# Patient Record
Sex: MALE | NOT HISPANIC OR LATINO | URBAN - METROPOLITAN AREA
[De-identification: names, ages, dates, MRNs, and addresses within clinical notes are randomized per-mention and may not be internally consistent; named-entity substitution may affect disease eponyms.]

---

## 2017-09-01 ENCOUNTER — IMPORTED ENCOUNTER (OUTPATIENT)
Dept: URBAN - METROPOLITAN AREA CLINIC 1 | Facility: CLINIC | Age: 62
End: 2017-09-01

## 2017-09-01 PROBLEM — E11.9: Noted: 2017-09-01

## 2017-09-01 PROBLEM — H25.813: Noted: 2017-09-01

## 2017-09-01 PROBLEM — H40.023: Noted: 2017-09-01

## 2017-09-01 PROBLEM — Z79.84: Noted: 2017-09-01

## 2017-09-01 PROBLEM — H35.033: Noted: 2017-09-01

## 2017-09-01 PROCEDURE — 92014 COMPRE OPH EXAM EST PT 1/>: CPT

## 2017-09-01 PROCEDURE — 92133 CPTRZD OPH DX IMG PST SGM ON: CPT

## 2017-09-01 NOTE — PATIENT DISCUSSION
1.  DM Type II without sign of diabetic retinopathy and no blot heme on dilated retinal examination today OU No Macular Edema:  Discussed the pathophysiology of diabetes and its effect on the eye and risk of blindness. Stressed the importance of strong glucose control. Advised of importance of at least yearly dilated examinations but to contact us immediately for any problems or concerns. Letter sent to Dr. Ambar Yin. 2.  Type II diabetes controlled by oral medications. 3.  Glaucoma Suspect OU (0.3 0.6) - IOP stable. Mild thinning by OCT OU but will cont to observe at this time. Patient is considered high risk. Condition was discussed with patient and patient understands. Will continue to monitor patient for any progression in condition. Patient was advised to call us with any problems questions or concerns. 4.  Cataract OU: Observe for now without intervention. The patient was advised to contact us if any change or worsening of vision. 5. GR I Hypertensive Retinopathy OU- Stable continue HTN Control. Return for an appointment in 1 year for 30 OCT with Dr. Aileen Fung.

## 2018-06-11 ENCOUNTER — IMPORTED ENCOUNTER (OUTPATIENT)
Dept: URBAN - METROPOLITAN AREA CLINIC 1 | Facility: CLINIC | Age: 63
End: 2018-06-11

## 2018-06-11 PROCEDURE — 92015 DETERMINE REFRACTIVE STATE: CPT

## 2019-08-26 ENCOUNTER — IMPORTED ENCOUNTER (OUTPATIENT)
Dept: URBAN - METROPOLITAN AREA CLINIC 1 | Facility: CLINIC | Age: 64
End: 2019-08-26

## 2019-08-26 PROBLEM — E11.9: Noted: 2019-08-26

## 2019-08-26 PROBLEM — Z79.84: Noted: 2019-08-26

## 2019-08-26 PROBLEM — H40.023: Noted: 2019-08-26

## 2019-08-26 PROBLEM — H25.813: Noted: 2019-08-26

## 2019-08-26 PROCEDURE — 92133 CPTRZD OPH DX IMG PST SGM ON: CPT

## 2019-08-26 PROCEDURE — 92014 COMPRE OPH EXAM EST PT 1/>: CPT

## 2019-08-26 PROCEDURE — 92015 DETERMINE REFRACTIVE STATE: CPT

## 2019-08-26 NOTE — PATIENT DISCUSSION
1.  DM Type II (Oral Meds) without sign of diabetic retinopathy and no blot heme on dilated retinal examination today OU No Macular Edema:  Discussed the pathophysiology of diabetes and its effect on the eye and risk of blindness. Stressed the importance of strong glucose control. Advised of importance of at least yearly dilated examinations but to contact us immediately for any problems or concerns. 2. Glaucoma Suspect OU (CD 0.3/0.6) Neg Fm Hx. OCT shows slight progression OUl. IOP stable on no gtts. Cont to observe off gtts. Treat with any future progression OU. 3.  Cataract OU: Observe for now without intervention. The patient was advised to contact us if any change or worsening of vision4. GR I Hypertensive Retinopathy OU- Stable continue HTN Control. Letter to PCP Nelsy Valdivia for an appointment in 6 mo 10 dfe glare VF 24-2 OU with Dr. Pauline Lorenz.

## 2020-12-17 ENCOUNTER — IMPORTED ENCOUNTER (OUTPATIENT)
Dept: URBAN - METROPOLITAN AREA CLINIC 1 | Facility: CLINIC | Age: 65
End: 2020-12-17

## 2020-12-17 PROBLEM — Z79.84: Noted: 2020-12-17

## 2020-12-17 PROBLEM — E11.9: Noted: 2020-12-17

## 2020-12-17 PROBLEM — H04.123: Noted: 2020-12-17

## 2020-12-17 PROBLEM — H40.023: Noted: 2020-12-17

## 2020-12-17 PROCEDURE — 92014 COMPRE OPH EXAM EST PT 1/>: CPT

## 2020-12-17 PROCEDURE — 92083 EXTENDED VISUAL FIELD XM: CPT

## 2020-12-17 NOTE — PATIENT DISCUSSION
1.  DM Type II (Oral Meds) without sign of diabetic retinopathy and no blot heme on dilated retinal examination today OU No Macular Edema:  Discussed the pathophysiology of diabetes and its effect on the eye and risk of blindness. Stressed the importance of strong glucose control. Advised of importance of at least yearly dilated examinations but to contact us immediately for any problems or concerns. 2. Glaucoma Suspect OU (CD 0.30/0.60) - Unknown Fm Hx. HVF WNL OD shows possible early VF defect OS vs artifact. IOP stable on no gtts. Cont to observe off gtts. Treat with any future progression OU. Pt is considered high risk OU. 3. Dry Eyes OU -Recommend ATs TID OU routinely (2x sample given)4. Cataract OU: Observe for now without intervention. The patient was advised to contact us if any change or worsening of vision5. GR I Hypertensive Retinopathy OU- Stable continue HTN Control. Patient deferred Manifest Rx today. Return for an appointment in 6 months 10/OCT with Dr. Marc Julien.

## 2020-12-17 NOTE — PATIENT DISCUSSION
Glaucoma Suspect OU (CD 0.30/0.60) - Unknown Fm Hx. HVF WNL OD shows possible early VF defect OS vs artifact. IOP stable on no gtts. Cont to observe off gtts. Treat with any future progression OU.

## 2021-06-17 ENCOUNTER — IMPORTED ENCOUNTER (OUTPATIENT)
Dept: URBAN - METROPOLITAN AREA CLINIC 1 | Facility: CLINIC | Age: 66
End: 2021-06-17

## 2021-06-17 PROBLEM — H40.023: Noted: 2021-06-17

## 2021-06-17 PROCEDURE — 99213 OFFICE O/P EST LOW 20 MIN: CPT

## 2021-06-17 PROCEDURE — 92133 CPTRZD OPH DX IMG PST SGM ON: CPT

## 2021-06-17 NOTE — PATIENT DISCUSSION
Dry Eyes OU -Recommend ATs TID OU routinely 3. Cataract OU: Observe for now without intervention. The patient was advised to contact us if any change or worsening of vision4. H/o DM w/o DR OU 5.   H/o GR I Hypertensive Retinopathy OU

## 2021-06-17 NOTE — PATIENT DISCUSSION
1.  Glaucoma Suspect OU (CD 0.30/0.60): OCT WNL OD mild RNFL thinning shown OS. IOP stable. Unknown family hx. Patient is considered high risk. Condition was discussed with patient and patient understands. Will continue to monitor patient for any progression in condition. Patient was advised to call us with any problems questions or concerns. 2.  Dry Eyes OU -Recommend ATs TID OU routinely 3. Cataract OU: Observe for now without intervention. The patient was advised to contact us if any change or worsening of vision4. H/o DM w/o DR OU 5. H/o GR I Hypertensive Retinopathy OUReturn for an appointment in 6 months 30/HVF/glare with Dr. Hansa Souza.

## 2021-12-10 ENCOUNTER — IMPORTED ENCOUNTER (OUTPATIENT)
Dept: URBAN - METROPOLITAN AREA CLINIC 1 | Facility: CLINIC | Age: 66
End: 2021-12-10

## 2021-12-10 PROBLEM — E11.9: Noted: 2021-12-10

## 2021-12-10 PROBLEM — H35.033: Noted: 2021-12-10

## 2021-12-10 PROBLEM — H04.123: Noted: 2021-12-10

## 2021-12-10 PROBLEM — H40.023: Noted: 2021-12-10

## 2021-12-10 PROBLEM — Z79.84: Noted: 2021-12-10

## 2021-12-10 PROBLEM — H25.813: Noted: 2021-12-10

## 2021-12-10 PROCEDURE — 92083 EXTENDED VISUAL FIELD XM: CPT

## 2021-12-10 PROCEDURE — 92015 DETERMINE REFRACTIVE STATE: CPT

## 2021-12-10 PROCEDURE — 92014 COMPRE OPH EXAM EST PT 1/>: CPT

## 2021-12-10 NOTE — PATIENT DISCUSSION
1.  DM Type II (Oral Meds) without sign of diabetic retinopathy and no blot heme on dilated retinal examination today OU No Macular Edema:  Discussed the pathophysiology of diabetes and its effect on the eye and risk of blindness. Stressed the importance of strong glucose control. Advised of importance of at least yearly dilated examinations but to contact us immediately for any problems or concerns. 2. Cataract OU -- Observe for now without intervention. The patient was advised to contact us if any change or worsening of vision3. Glaucoma Suspect OU -- (CD: 0.30/0.60) HVF WNL OU stable. IOP stable OU. Unknown Family Hx. Patient is considered high risk. Condition was discussed with patient and patient understands. Will continue to monitor patient for any progression in condition. Patient was advised to call us with any problems questions or concerns. 4.  Dry Eyes OU -- Recommended ATs BID OU routinely. 5.  GR I Hypertensive Retinopathy OU -- Stable continue HTN ControlMRx for glasses given to patient. Letter to PCP. Return for an appointment in 6 months 10/OCT/Glare with Dr. Deb Brush.

## 2022-03-11 ASSESSMENT — TONOMETRY
OS_IOP_MMHG: 15
OD_IOP_MMHG: 15
OD_IOP_MMHG: 15
OD_IOP_MMHG: 16
OD_IOP_MMHG: 15
OS_IOP_MMHG: 15
OD_IOP_MMHG: 14
OS_IOP_MMHG: 16
OS_IOP_MMHG: 14
OS_IOP_MMHG: 15

## 2022-03-11 ASSESSMENT — VISUAL ACUITY
OS_SC: 20/30-2
OD_CC: J1
OD_GLARE: 20/400
OS_SC: 20/25
OS_SC: 20/20
OS_CC: J1+
OS_SC: 20/50-1
OS_PH: CC 20/25
OD_SC: 20/20
OS_GLARE: 20/30
OD_SC: 20/20
OS_GLARE: 20/400
OD_SC: 20/20
OD_SC: 20/25
OS_GLARE: 20/400
OD_GLARE: 20/400
OD_SC: 20/20
OS_CC: J1
OS_SC: 20/30
OD_GLARE: 20/400
OD_CC: J1+
OS_SC: 20/25
OS_GLARE: 20/400
OD_SC: 20/20
OD_GLARE: 20/40